# Patient Record
Sex: MALE | Race: WHITE
[De-identification: names, ages, dates, MRNs, and addresses within clinical notes are randomized per-mention and may not be internally consistent; named-entity substitution may affect disease eponyms.]

---

## 2017-10-02 ENCOUNTER — HOSPITAL ENCOUNTER (OUTPATIENT)
Dept: HOSPITAL 76 - ED | Age: 72
Setting detail: OBSERVATION
Discharge: HOME | End: 2017-10-02
Attending: SURGERY | Admitting: SURGERY
Payer: MEDICARE

## 2017-10-02 VITALS — DIASTOLIC BLOOD PRESSURE: 70 MMHG | SYSTOLIC BLOOD PRESSURE: 114 MMHG

## 2017-10-02 DIAGNOSIS — I10: ICD-10-CM

## 2017-10-02 DIAGNOSIS — Z79.899: ICD-10-CM

## 2017-10-02 DIAGNOSIS — Z86.79: ICD-10-CM

## 2017-10-02 DIAGNOSIS — M19.90: ICD-10-CM

## 2017-10-02 DIAGNOSIS — K35.89: Primary | ICD-10-CM

## 2017-10-02 DIAGNOSIS — Z79.82: ICD-10-CM

## 2017-10-02 DIAGNOSIS — F41.9: ICD-10-CM

## 2017-10-02 DIAGNOSIS — E78.00: ICD-10-CM

## 2017-10-02 DIAGNOSIS — F32.9: ICD-10-CM

## 2017-10-02 LAB
ALBUMIN/GLOB SERPL: 1.4 {RATIO} (ref 1–2.2)
ANION GAP SERPL CALCULATED.4IONS-SCNC: 8 MMOL/L (ref 6–13)
BASOPHILS NFR BLD AUTO: 0.1 10^3/UL (ref 0–0.1)
BASOPHILS NFR BLD AUTO: 0.6 %
BILIRUB BLD-MCNC: 0.9 MG/DL (ref 0.2–1)
BUN SERPL-MCNC: 20 MG/DL (ref 6–20)
CALCIUM UR-MCNC: 8.9 MG/DL (ref 8.5–10.3)
CHLORIDE SERPL-SCNC: 98 MMOL/L (ref 101–111)
CO2 SERPL-SCNC: 26 MMOL/L (ref 21–32)
CREAT SERPLBLD-SCNC: 0.8 MG/DL (ref 0.6–1.2)
CUL URINE ADD CHARGE: (no result)
EOSINOPHIL # BLD AUTO: 0 10^3/UL (ref 0–0.7)
EOSINOPHIL NFR BLD AUTO: 0.3 %
ERYTHROCYTE [DISTWIDTH] IN BLOOD BY AUTOMATED COUNT: 13.5 % (ref 12–15)
GFRSERPLBLD MDRD-ARVRAT: 96 ML/MIN/{1.73_M2} (ref 89–?)
GLOBULIN SER-MCNC: 3 G/DL (ref 2.1–4.2)
GLUCOSE SERPL-MCNC: 128 MG/DL (ref 70–100)
HCT VFR BLD AUTO: 39.2 % (ref 42–52)
HGB UR QL STRIP: 13.4 G/DL (ref 14–18)
LIPASE SERPL-CCNC: 19 U/L (ref 22–51)
LYMPHOCYTES # SPEC AUTO: 1.5 10^3/UL (ref 1.5–3.5)
LYMPHOCYTES NFR BLD AUTO: 11.4 %
MCH RBC QN AUTO: 30.2 PG (ref 27–31)
MCHC RBC AUTO-ENTMCNC: 34.1 G/DL (ref 32–36)
MCV RBC AUTO: 88.7 FL (ref 80–94)
MONOCYTES # BLD AUTO: 1.1 10^3/UL (ref 0–1)
MONOCYTES NFR BLD AUTO: 8.1 %
NEUTROPHILS # BLD AUTO: 10.5 10^3/UL (ref 1.5–6.6)
NEUTROPHILS # SNV AUTO: 13.3 X10^3/UL (ref 4.8–10.8)
NEUTROPHILS NFR BLD AUTO: 79.6 %
NRBC # BLD AUTO: 0 /100WBC
PDW BLD AUTO: 7.7 FL (ref 7.4–11.4)
PH UR STRIP.AUTO: 8.5 PH (ref 5–7.5)
POTASSIUM SERPL-SCNC: 4.3 MMOL/L (ref 3.5–5)
PROT SPEC-MCNC: 7.1 G/DL (ref 6.7–8.2)
RBC MAR: 4.43 10^6/UL (ref 4.7–6.1)
SODIUM SERPLBLD-SCNC: 132 MMOL/L (ref 135–145)
SP GR UR STRIP.AUTO: 1.01 (ref 1–1.03)
UA CHARGE (STRIP ONLY): YES
UA W/ MICROSCOPIC CHARGE: (no result)
UR CULTURE IF IND: (no result)
UROBILINOGEN UR STRIP.AUTO-MCNC: NEGATIVE MG/DL
WBC # BLD: 13.3 X10^3/UL

## 2017-10-02 PROCEDURE — 96365 THER/PROPH/DIAG IV INF INIT: CPT

## 2017-10-02 PROCEDURE — 36415 COLL VENOUS BLD VENIPUNCTURE: CPT

## 2017-10-02 PROCEDURE — 81001 URINALYSIS AUTO W/SCOPE: CPT

## 2017-10-02 PROCEDURE — 96375 TX/PRO/DX INJ NEW DRUG ADDON: CPT

## 2017-10-02 PROCEDURE — 99284 EMERGENCY DEPT VISIT MOD MDM: CPT

## 2017-10-02 PROCEDURE — 85025 COMPLETE CBC W/AUTO DIFF WBC: CPT

## 2017-10-02 PROCEDURE — 83690 ASSAY OF LIPASE: CPT

## 2017-10-02 PROCEDURE — 99283 EMERGENCY DEPT VISIT LOW MDM: CPT

## 2017-10-02 PROCEDURE — 44970 LAPAROSCOPY APPENDECTOMY: CPT

## 2017-10-02 PROCEDURE — 74177 CT ABD & PELVIS W/CONTRAST: CPT

## 2017-10-02 PROCEDURE — 81003 URINALYSIS AUTO W/O SCOPE: CPT

## 2017-10-02 PROCEDURE — 80053 COMPREHEN METABOLIC PANEL: CPT

## 2017-10-02 PROCEDURE — 87086 URINE CULTURE/COLONY COUNT: CPT

## 2017-10-02 PROCEDURE — 88304 TISSUE EXAM BY PATHOLOGIST: CPT

## 2017-10-02 PROCEDURE — 0DTJ4ZZ RESECTION OF APPENDIX, PERCUTANEOUS ENDOSCOPIC APPROACH: ICD-10-PCS | Performed by: SURGERY

## 2017-10-02 NOTE — HISTORY & PHYSICAL EXAMINATION
DATE OF ADMISSION: 10/02/2017

 

ADMITTING PHYSICIAN: Armin Hines MD.

 

REASON FOR ADMISSION: Abdominal pain.

 

HISTORY OF PRESENT ILLNESS: This is a 71-year-old gentleman who presented to 
the emergency department yesterday late in the evening after developing mid 
abdominal pain located at his umbilicus, which radiated to the right lower 
quadrant. Upon evaluation in the emergency department, labs and a CT scan were 
performed. He was noted to have a slight leukocytosis and CT scan findings 
consistent with unruptured acute appendicitis. On my evaluation of the patient, 
he states his pain is about a 2/10. His last meal was 6 p.m. last night. He 
also had chills at home, but has been afebrile in the hospital. Denies any 
history of nausea or vomiting. He was feeling fine up until 9 p.m. yesterday.

 

PAST MEDICAL HISTORY: Significant for history of pericarditis 4 years ago of 
unknown etiology, arthritis, hypertension, and hypercholesterolemia.

 

PAST SURGICAL HISTORY: Multiple knee arthroscopies bilaterally.

 

SOCIAL HISTORY: The patient denies tobacco use, alcohol use, or illicit drug 
use. The patient lives independently.

 

HOME MEDICATIONS

1. Lisinopril 10 mg p.o. daily.

2. Hydrochlorothiazide 12.5 mg p.o. daily.

3. Aspirin 81 mg daily.

 

ALLERGIES TO MEDICATIONS: PENICILLIN.

 

PHYSICAL EXAMINATION

VITAL SIGNS: Temperature is 36.9, blood pressure 126/70, respiratory rate 17, 
O2 saturation is 97% on room air, heart rate is 89.

GENERAL: The patient is awake, alert, oriented x3, in no acute distress. He is 
of average build.

CARDIOVASCULAR: Regular rate and rhythm.

CHEST: Clear to auscultation bilaterally. No rhonchi or wheezing.

ABDOMEN: Soft and nondistended. He is slightly tender to palpation in the right 
lower quadrant with no guarding and no rigidity.

EXTREMITIES: Nonedematous.

 

LABORATORY VALUES: White count 13.3, hemoglobin 13.4, hematocrit 39.2, 
platelets 202. Sodium 132, potassium 4.3, chloride 98, bicarbonate 26, BUN 20, 
creatinine 0.8.

 

ASSESSMENT: This is a 71-year-old gentleman with acute appendicitis.

 

PLAN: The patient will be taken to the operating room for laparoscopic, 
possible open appendectomy. The procedure was explained to the patient in 
detail including the potential risks involved including, but not limited to 
bleeding, infection and damage to intra-abdominal structures. He understands 
all of the above and agrees to proceed.

 

 

 

DD:10/02/2017 9:52:00  DT: 10/02/2017 10:10  JOB #: [id number]  EXT JOB #:[id number]

SATISH

## 2017-10-02 NOTE — DISCHARGE PLAN
Discharge Plan


Disposition: 01 Home, Self Care


Condition: Stable


Prescriptions: 


oxyCODONE/ACET 5/325 [Percocet 5 mg/325 mg] 1 tab PO Q4HR PRN #20 tablet


 PRN Reason: Pain


Diet: Regular


Activity Restrictions: see handout


No Smoking: If you smoke, Please STOP!  Call [phone] for help.


Follow-up with: 


SHIVA TRONCOSO MD [Provider Admit Priv/Credential] - 4 Weeks

## 2017-10-02 NOTE — CT PRELIMINARY REPORT
Accession: [id number]

Exam: CT Abdomen/Pelvis W/

 

IMPRESSION: 

1. Appendicitis measuring up to 13 mm in diameter. No abscess seen. 

2. Moderate to large amount of stool in the colon.

 

[facility] [place]

 

SITE ID: [id number]

## 2017-10-02 NOTE — CT REPORT
EXAM:

CT ABDOMEN AND PELVIS

 

EXAM DATE: 10/2/2017 04:12 AM.

 

CLINICAL HISTORY: Right lower quadrant tenderness.

 

COMPARISONS: None.

 

TECHNIQUE: Routine helical CT imaging was performed through the abdomen and pelvis. IV contrast: Davina
onic. Enteric contrast: No. Reconstructions: Coronal and sagittal.

 

In accordance with CT protocol optimization, one or more of the following dose reduction techniques w
ere utilized for this exam: automated exposure control, adjustment of mA and/or KV based on patient s
ize, or use of iterative reconstructive technique.

 

FINDINGS: 

Lung Bases: Bibasilar atelectasis.

 

Liver: Subcentimeter right lobe cyst.

 

Gallbladder/Bile Ducts: Unremarkable.

 

Spleen: Normal.

 

Pancreas: Normal.

 

Adrenal Glands: Normal.

 

Kidneys: Small left renal cyst. No masses or hydronephrosis.

 

Peritoneal Cavity/Bowel: Moderate to large amount of stool in the colon. No diverticulitis. No small 
bowel obstruction. No lymphadenopathy. No free air or free fluid. Dilated inflamed appendix measuring
 13 mm. Appendicoliths are seen. No periappendiceal abscess.

 

Pelvic Organs: Normal. The bladder and visualized pelvic organs are within normal limits.

 

Vasculature: Mild to moderate atherosclerosis. No aortic aneurysm.

 

Bones: Degenerative changes in the spine.

 

Other: None.

 

IMPRESSION: 

1. Appendicitis measuring up to 13 mm in diameter. No abscess seen. 

2. Moderate to large amount of stool in the colon.

 

[facility]

Referring Provider Line: [phone]

 

SITE ID: [id number]

## 2017-10-02 NOTE — ED PHYSICIAN DOCUMENTATION
PD HPI ABD PAIN





- Stated complaint


Stated Complaint: ABDOMINAL PAIN





- Chief complaint


Chief Complaint: Abd Pain





- History obtained from


History obtained from: Patient





- History of Present Illness


Timing - onset: Today


Timing - details: Gradual onset, Still present


Quality: Sharp


Location: RLQ


Associated symptoms: No: Fever, Nausea, Vomiting, Hematemesis, Diarrhea, 

Constipation, Dysuria, Hematuria


Similar symptoms before: Has not had sx before


Recently seen: Not recently seen





- Additional information


Additional information: 





Patient is a 71 year old male wiht a history of htn who is presenting to the 

emergency department for right lower quadrant pain.  Patient states that it 

started about 6 hours ago.  Patient stated that he was planking earlier but the 

only spot that hurts is his right lower quadrant.  patient denies nausea, 

vomiting fever or chills.  





Review of Systems


Constitutional: denies: Fever, Chills


Eyes: denies: Decreased vision, Photophobia


Ears: denies: Ear pain, Drainage/discharge


Nose: denies: Congestion


Throat: denies: Sore throat


Cardiac: denies: Chest pain / pressure, Palpitations


Respiratory: denies: Dyspnea, Cough, Wheezing


GI: reports: Abdominal Pain.  denies: Nausea, Vomiting, Constipation, Diarrhea


: denies: Dysuria, Frequency, Hesitancy, Hematuria


Skin: denies: Rash, Lesions


Musculoskeletal: denies: Neck pain, Back pain


Neurologic: denies: Generalized weakness, Focal weakness, Numbness


Immunocompromised: denies: Immunocompromised





PD PAST MEDICAL HISTORY





- Past Medical History


Cardiovascular: Hypertension, Angina


Respiratory: None


Endocrine/Autoimmune: None


GI: None, Other


: None


HEENT: None


Psych: Depression, Anxiety


Musculoskeletal: None


Derm: None





- Past Surgical History


General: Other


Ortho: Arthroscopic surgery


HEENT: Tonsil/Adenoidectomy





- Present Medications


Home Medications: 


 Ambulatory Orders











 Medication  Instructions  Recorded  Confirmed


 


Aspirin 81 mg PO DAILY 10/24/13 10/02/17


 


Lisinopril/Hydrochlorothiazide 1 each PO DAILY 10/24/13 10/02/17





[Lisinopril-Hctz 10-12.5 mg Tab]   


 


Atorvastatin [Lipitor] 20 mg PO 10/02/17 














- Allergies


Allergies/Adverse Reactions: 


 Allergies











Allergy/AdvReac Type Severity Reaction Status Date / Time


 


Penicillins Allergy Unknown UNK Verified 10/02/17 02:58














PD ED PE NORMAL





- Vitals


Vital signs reviewed: Yes





- General


General: Alert and oriented X 3





- HEENT


HEENT: Atraumatic, PERRL





- Neck


Neck: Supple, no meningeal sign





- Cardiac


Cardiac: RRR, No murmur





- Respiratory


Respiratory: No respiratory distress, Clear bilaterally





- Male 


Male : Other (normal)





- Derm


Derm: Normal color, Warm and dry, No rash





- Extremities


Extremities: No deformity, No tenderness to palpate, Normal ROM s pain





- Neuro


Neuro: Alert and oriented X 3, CNs 2-12 intact, No motor deficit, No sensory 

deficit, Normal speech





- Psych


Psych: Normal mood, Normal affect





PD ED PE EXPANDED





- Abdomen


Abdomen: Tender to palpation, Guarding, RLQ.  No: Rebound





Results





- Vitals


Vitals: 


 Vital Signs - 24 hr











  10/02/17





  02:55


 


Heart Rate 113 H


 


Respiratory 18





Rate 


 


Blood Pressure 134/81 H


 


O2 Saturation 100








 Oxygen











O2 Source                      Room air

















- Labs


Labs: 


 Laboratory Tests











  10/02/17 10/02/17





  03:20 03:20


 


WBC  13.3 H 


 


RBC  4.43 L 


 


Hgb  13.4 L 


 


Hct  39.2 L 


 


MCV  88.7 


 


MCH  30.2 


 


MCHC  34.1 


 


RDW  13.5 


 


Plt Count  202 


 


MPV  7.7 


 


Neut #  10.5 H 


 


Lymph #  1.5 


 


Mono #  1.1 H 


 


Eos #  0.0 


 


Baso #  0.1 


 


Absolute Nucleated RBC  0.00 


 


Nucleated RBC %  0.0 


 


Sodium   132 L


 


Potassium   4.3


 


Chloride   98 L


 


Carbon Dioxide   26


 


Anion Gap   8.0


 


BUN   20


 


Creatinine   0.8


 


Estimated GFR (MDRD)   96


 


Glucose   128 H


 


Calcium   8.9


 


Total Bilirubin   0.9


 


AST   28


 


ALT   29


 


Alkaline Phosphatase   51


 


Total Protein   7.1


 


Albumin   4.1


 


Globulin   3.0


 


Albumin/Globulin Ratio   1.4


 


Lipase   19 L














PD MEDICAL DECISION MAKING





- ED course


Complexity details: reviewed old records, reviewed results, re-evaluated patient

, considered differential, d/w patient, d/w consultant


ED course: 





Patient was seen and examined at bedside.  IV access was gained and labs were 

drawn.  patient was sent for imaging.  when patient returned the results were 

reviewed.  there was an acute appendicitis found with mild leukocytosis.  Case 

was discussed with oncall surgeon, Dr. Hines who admitted the patient for 

surgery later today.  





Departure





- Departure


Disposition: [facility] DC/Xfer


Clinical Impression: 


 Appendicitis





Condition: Stable

## 2017-10-03 NOTE — OPERATIVE REPORT
DATE OF SURGERY:  10/02/2017 00:00:00

 

PREOPERATIVE DIAGNOSIS: Acute appendicitis.

 

POSTOPERATIVE DIAGNOSIS: Acute appendicitis.

 

PROCEDURE PERFORMED: Laparoscopic appendectomy. 

 

SURGEON: Rashida Pace MD

 

ANESTHESIA PROVIDER: Sandeep Plunkett.

 

INDICATION FOR PROCEDURE: This is a 71-year-old male who presented to the 
emergency department yesterday with a 1-day history of abdominal pain and CT 
scan findings consistent with acute appendicitis.

 

FINDINGS: After obtaining informed consent from the patient, he was brought 
into the operating room and positioned on the operating table in the supine 
position, taking note of pressure points. He was intubated by Anesthesia. He 
was then prepped and draped in the usual sterile fashion, and a timeout was 
taken according to protocol. 

 

An infraumbilical 1 cm incision was created and deepened down to the umbilical 
stalk. This was grasped and elevated and the Veress needle inserted. The 
abdominal cavity was insufflated. A 5 mm incision was created in the left lower 
quadrant and the Optiview trocar utilized to enter the abdominal cavity. The 
Veress needle was removed and exchanged for a 12 mm port. The underlying 
peritoneum was visualized, and there was no injury upon entrance into the 
abdominal cavity. A 3rd 5 mm port was placed in the suprapubic region. 

 

The patient was positioned in Trendelenburg with the right side elevated and 
the appendix identified. It was noted to be very inflamed with surrounding 
phlegmon. The appendix was difficult to grasp. I rotated it medially and 
 the appendiceal attachments from the lateral sidewall with the 
LigaSure. The appendix was noted to be inflamed all the way down to its base. 
There was some purulent fluid exposed while mobilizing the appendix. The 
mesoappendix was then divided using the LigaSure and the base of the appendix 
completely exposed. The 45 mm [address] stapling device was utilized to amputate 
the appendix. It was placed in a specimen bag and removed through the umbilical 
port. The mesoappendix and staple line were inspected, and staple line appeared 
to be intact without any signs of bleeding. All purulent fluid was completely 
evacuated. The Enoc-Lisa device was then used to close the umbilical 
fascial defect using a 0 figure-of-8 Vicryl suture. The abdominal cavity was 
allowed to desufflate, and the skin incisions were closed with 4-0 Monocryl; 20 
mL of local anesthetic was utilized.

 

ESTIMATED BLOOD LOSS: 5 mL.

 

COMPLICATIONS: None.

 

SPECIMEN: Appendix.

 

 

 

DD:10/02/2017 11:45:00  DT: 10/02/2017 12:07  JOB #: [id number]  EXT JOB #:[id number]

MTDHOMA

## 2017-10-10 NOTE — PROVIDER PROGRESS NOTE
Subjective





- Prog Note Date


Prog Note Date: 10/02/17





- Subjective


Pt reports feeling: Improved





Objective





- Vital Signs/Intake & Output


Reviewed Vital Signs: Yes





- Objective


General Appearance: positive: No acute distress


Respiratory: positive: No respiratory distress


Cardiovascular: positive: Regular rate & rhythm


Abdomen: positive: Other (appropriate post surgical abdomen)


Extremities: positive: No pedal edema


Neurologic/Psychiatric: positive: Oriented x3





- Lab Results


Fish Bones: 


 10/02/17 03:20





 10/02/17 03:20





Assessment/Plan





- Problem List


(1) Appendicitis


Impression: 


Discharge home once recovered

## 2017-12-11 ENCOUNTER — HOSPITAL ENCOUNTER (OUTPATIENT)
Dept: HOSPITAL 76 - DI | Age: 72
Discharge: HOME | End: 2017-12-11
Attending: FAMILY MEDICINE
Payer: MEDICARE

## 2017-12-11 DIAGNOSIS — M47.897: Primary | ICD-10-CM

## 2017-12-11 PROCEDURE — 72100 X-RAY EXAM L-S SPINE 2/3 VWS: CPT

## 2017-12-12 NOTE — XRAY REPORT
EXAM:

LUMBOSACRAL SPINE RADIOGRAPHY

 

EXAM DATE: 12/11/2017 12:40 PM.

 

CLINICAL HISTORY: NUMBNESS L PLANTAR FOOT WITH PROLONGED STANDING.

 

COMPARISONS: Abdominal pelvic CT 10/02/2017.

 

TECHNIQUE: 3 views.

 

FINDINGS:

Alignment: Normal. No spondylolisthesis or scoliosis.

 

Bones: 6 non-rib-bearing lumbar vertebral bodies are present, the lowest lumbar shaped vertebral body
 will be labeled L5. 

No fractures or bone lesions.

 

Disks: Moderate to severe loss of L5-S1 disk space. 

Mild loss of disk space height at L3-L4 and L4-L5 levels. 

Small anterior osteophytes throughout the lumbar spine, sparing L4-L5 level.

 

Facets: Normally aligned. Mild L5-S1 facet hypertrophy.

 

Sacroiliac Joints: Unremarkable.

 

Soft Tissues: Prominent stool within right hemicolon. No dilated bowel evident. Atherosclerotic abdom
inal aortic calcifications.

 

IMPRESSION: 

1. Prominent L5-S1 degenerative changes.

2. No fracture evident.

 

[facility]

Referring Provider Line: [phone]

 

SITE ID: [id number]

## 2018-01-09 ENCOUNTER — HOSPITAL ENCOUNTER (OUTPATIENT)
Dept: HOSPITAL 76 - DI | Age: 73
Discharge: HOME | End: 2018-01-09
Attending: INTERNAL MEDICINE
Payer: MEDICARE

## 2018-01-09 DIAGNOSIS — M43.17: ICD-10-CM

## 2018-01-09 DIAGNOSIS — M51.25: ICD-10-CM

## 2018-01-09 DIAGNOSIS — M51.36: ICD-10-CM

## 2018-01-09 DIAGNOSIS — M51.26: Primary | ICD-10-CM

## 2018-01-09 PROCEDURE — 72148 MRI LUMBAR SPINE W/O DYE: CPT

## 2020-12-24 NOTE — MRI REPORT
EXAM:

MRI LUMBAR SPINE WITHOUT CONTRAST

 

EXAM DATE: 1/9/2018 09:31 AM.

 

CLINICAL HISTORY: Neural claudication.

 

COMPARISON: Lumbar spine radiographs 12/11/2017.

 

TECHNIQUE: Multiplanar, multisequence T1-weighted and fluid-sensitive sequences of the lumbar spine w
ithout contrast. Other: None.

 

FINDINGS: 

A tiny 3 mm T2 hyperintensity is seen in the lower pole of the left kidney. Statistically, this likel
y reflects a tiny cortical cyst. The visualized abdominal aorta is not aneurysmal.

 

On the comparison radiographs there are 6 non-rib-bearing lumbar-type vertebral bodies. For numbering
 purposes, the first non-rib-bearing lumbar-type vertebral body will be labeled as S1. This numbering
 scheme makes the transitional lumbosacral type vertebral body.

 

No suspicious marrow replacement is identified in the lumbar vertebral bodies.

 

Diskogenic marrow edema is seen at L6-S1. Grade 1 retrolisthesis of L6 relative to S1 and L4 relative
 to L5 is noted.

 

The distal tip of the conus medullaris is seen near the L2-L3 disk space level.

 

T10-T11 and T11-T12: No posterior disk protrusion.

 

T12-L1: A mild right paracentral disk protrusion is seen. There is no central canal or foraminal sten
osis on the sagittal views.

 

L1-L2: No posterior disk protrusion.

 

L2-L3: A mild posterior disk protrusion is seen. Superior lateral recess narrowing is present bilater
ally.

 

L3-L4: A minimal posterior disk protrusion is seen. There is a shallow foraminal and far lateral comp
onent bilaterally, greater on the left relative to the right. Disk material attenuates the fat surrou
nding the left L3 nerve root after it exits its foramen. No overt foraminal stenosis is present. Supe
rior lateral recess narrowing is present bilaterally. No central canal stenosis.

 

L4-L5: A mild posterior disk protrusion is seen. There is a shallow posterolateral and foraminal comp
onent bilaterally. There is proximal foraminal narrowing bilaterally without overt foraminal stenosis
. There is a far lateral component of the disk protrusion bilaterally. Superior lateral recess narrow
ing is present bilaterally. No central canal stenosis.

 

L5-L6: A minimal posterior disk protrusion is seen with a shallow foraminal component bilaterally. Pr
oximal foraminal narrowing is seen bilaterally. No central canal or foraminal stenosis is present. Soliz
perior lateral recess narrowing is seen bilaterally.

 

L6-S1: Moderate loss of disk space height is present. A mild posterior disk protrusion is seen. There
 is a shallow foraminal protrusion bilaterally. No central canal or lateral recess stenosis is presen
t. There is moderate to severe left and moderate right foraminal stenosis.

 

Facet/ligamentum flavum hypertrophy is seen throughout the lumbar spine, greatest in the mid and lowe
r lumbar spine.

 

IMPRESSION: 

1. Degenerative disk disease is present at multiple levels discussed in detail. 

2. No central canal stenosis. 

3. Bilateral foraminal stenosis is present at L6-S1. 

4. There is a far lateral component to the protrusion on the left at L3-L4 with disk material attenua
ting the fat surrounding the left L3 nerve root after it exits its foramen. 

5. Diskogenic marrow edema is seen at L6-S1 and there is spondylolisthesis at this level. 

6. Ambiguous segmentation of the lumbar spine with the numbering scheme discussed above.

 

Comment: The following findings are so common in adults without low back pain that while we report th
eir presence, they must be interpreted with caution and in the context of the clinical situation. (Re
nicloe Pang et al, Spine 2001)

 

Prevalence of findings in patients without low back pain:

Disk degeneration (any evidence): 92%

Disk desiccation/T2 signal loss: 83%

Disk height loss: 56%

Disk bulge: 64%

Disk protrusion: 32%

Annular tear/high intensity zone: 38%

 

[facility]

Referring Provider Line: [phone]

 

SITE ID: [id number]
none

## 2021-11-02 ENCOUNTER — HOSPITAL ENCOUNTER (OUTPATIENT)
Dept: HOSPITAL 76 - DI | Age: 76
Discharge: HOME | End: 2021-11-02
Attending: PHYSICIAN ASSISTANT
Payer: MEDICARE

## 2021-11-02 DIAGNOSIS — R07.89: Primary | ICD-10-CM

## 2021-11-02 DIAGNOSIS — Z82.49: ICD-10-CM

## 2021-11-02 DIAGNOSIS — R53.83: ICD-10-CM

## 2021-11-02 DIAGNOSIS — I10: ICD-10-CM

## 2021-11-02 PROCEDURE — 93017 CV STRESS TEST TRACING ONLY: CPT

## 2021-11-02 NOTE — CARDIAC PROCEDURE NOTE
Stress Test Report


Service Date: 11/02/21


Service Time: 09:00


Ordering Provider: Reina Samuels PA-C


Indication for Test: 


Assess episodic non-exertional left upper chest discomfort.





Significant Medical History: 


-Juan reports a history of hypertension treated for the past 20-25 years and has 

been on statin therapy chronically, to lower cholesterol.  He has been very 

active, walking around [facility] (with hills), a total of 4-7 miles almost every

day.  About 3-4 weeks ago he began to experience an episodic, mild, non-

exertional left upper chest discomfort, that has had no identifiable pattern of 

occurrence, no triggers and no alleviating factors. There have been no 

associated concomitants with the discomfort, such as dyspnea, diaphoresis, 

nausea or lightheadedness. He was seen in clinic and a treadmill stress test was

ordered for today, but he reports that the discomfort has been less evident for 

the past week or 2, almost nonexistent.  However on further questioning prior to

starting the test he admits to feeling it at a level of "one half out of 10".


-He has a cardiac history that includes an episode of pericarditis about 8 years

ago, for which he underwent a coronary angiogram at [facility] in [place], 

that revealed a single vessel with 20% narrowing.  He is concerned about the 

fact that his father experienced complex CAD at about the same age he is now, 

requiring bypass surgery and valve replacement.  He feels that his lifestyle has

been healthy, given his high exercise level and a diet that is low in red meat, 

while continuing on fish oil and simvastatin chronically. He does not monitor 

his blood pressure at home, though he believes it has been controlled at clinic 

visits.





Cardiac Risk Factors: 


Positive for age, male gender, hypertension, hyperlipidemia and CAD in his 

father; negative for diabetes or tobacco use, ever.





Type of Stress Test: Exercise Treadmill Test (ETT)


Procedure: 


-Exercise Treadmill Test-





After signing informed consent, the patient performed treadmill exercise using a

Ced protocol. The patient exercised for 9 minutes 19 seconds and achieved a 

peak heart rate of 152 (104 percent predicted maximum heart rate for age), and 

an estimated workload of 10.7 METS.





The test was terminated due to fatigue/shortness of breath, occurring after he 

achieved target HR.





Resting heart rate: 76   Peak heart rate: 152   Normal response to exercise.


Resting BP: 148/77   Peak BP: 199/91   Mildly hypertensive at rest with 

physiologic BP response to exercise. Note that he last took his HCTZ and 

lisinopril last evening.


Rhythm during exercise: Sinus rhythm with rare isolated PVCs. 





Symptoms: Early in exercise he reported that the mild left upper chest 

discomfort presesnt at test onset had fully resolved; in stage 4 he described a 

different, mild discomfort lower down in his left chest, around the level of his

left nipple.  This discomfort resolved almost immediately following conclusion 

of exercise.





EKG at rest showed normal sinus rhythm, with left atrial abnormality and low 

precordial QRS voltages; QRS/ST/T morphologies were normal throughout the 

tracing.


EKG at peak stress showed J-point depression with mostly upsloping ST depression

in inferior and anterolateral leads, probably maximal at about 0.7 mm; in the 

setting of the different, more typical lower left chest discomfort these should 

be considered borderline positive for ischemia.





In Recovery heart rate and blood pressure returned normally to baseline levels.





No imaging was ordered with this stress test.





I, Ha Wooten MD, was present throughout this treadmill exercise test and 

supervised it in its entirety.





Summary: 


1) Exercise tolerance well above average for age, as evidenced by ANDRZEJ of -48%.


2) Normal resting EKG.


3) Adequate level of exercise was achieved on this treadmill stress test. 


4) Mildly hypertensive at rest with physiologic BP response to exercise.


5) Borderline ischemic changes by EKG criteria were seen at peak stress.


6) No imaging was ordered with this test.





CONCLUSIONS:


1) Phillip did not experience the atypical upper left chest discomfort that 

prompted this ETT; however, he did experience very mild discomfort of more 

typical character during stage 4, that resolved nearly immediately with exercise

discontinuation. He stated that the intensity of exercise during stage 4 was 

above what he would likely ever achieve during his extensive walking.


2) I recommended that he: continue ASA 81 mg at least every other day; obtain an

arm BP cuff with which to measure and record blood pressures at several 

assessments in the mornings prior to taking BP medications, and again 10-12 

hours later; and pay careful attention to whether there is recurrence of the 

more typical left lower chest discomfort and report any occurrence of this 

symptom if it recurs. He should make an appointment with Dr. Guerin in 3-4 

weeks, at which time they can review interim symptoms, lab results and adequacy 

of blood pressure control. I would recommend that his LDL cholesterol be 

targeted to the range of 5060 mg/dL, which may require transition to a high 

potency statin.


3) Should there be concern for recurrence of the more typical exertional chest 

discomfort, then a stress test with imaging (with echo or myocardial perfusion 

study) would be advisable.

## 2023-08-24 ENCOUNTER — HOSPITAL ENCOUNTER (EMERGENCY)
Age: 78
Discharge: HOME | End: 2023-08-24
Payer: MEDICARE

## 2023-08-24 VITALS
OXYGEN SATURATION: 97 % | DIASTOLIC BLOOD PRESSURE: 77 MMHG | HEART RATE: 78 BPM | RESPIRATION RATE: 18 BRPM | SYSTOLIC BLOOD PRESSURE: 140 MMHG

## 2023-08-24 VITALS
SYSTOLIC BLOOD PRESSURE: 144 MMHG | RESPIRATION RATE: 22 BRPM | OXYGEN SATURATION: 97 % | DIASTOLIC BLOOD PRESSURE: 81 MMHG | HEART RATE: 74 BPM

## 2023-08-24 VITALS
SYSTOLIC BLOOD PRESSURE: 163 MMHG | HEART RATE: 87 BPM | DIASTOLIC BLOOD PRESSURE: 95 MMHG | TEMPERATURE: 98 F | RESPIRATION RATE: 14 BRPM | OXYGEN SATURATION: 99 %

## 2023-08-24 VITALS — BODY MASS INDEX: 25.8 KG/M2

## 2023-08-24 VITALS — OXYGEN SATURATION: 99 % | RESPIRATION RATE: 24 BRPM | HEART RATE: 87 BPM

## 2023-08-24 VITALS — OXYGEN SATURATION: 98 % | RESPIRATION RATE: 14 BRPM | HEART RATE: 73 BPM

## 2023-08-24 DIAGNOSIS — S09.90XA: ICD-10-CM

## 2023-08-24 DIAGNOSIS — Z23: ICD-10-CM

## 2023-08-24 DIAGNOSIS — R55: Primary | ICD-10-CM

## 2023-08-24 LAB
ADD MANUAL DIFF / SLIDE REVIEW: NO
ALBUMIN SERPL-MCNC: 4.4 G/DL (ref 3.5–5)
ALBUMIN/GLOB SERPL: 1.6 {RATIO} (ref 1–2.8)
ALP SERPL-CCNC: 52 U/L (ref 38–126)
ALT SERPL-CCNC: 31 IU/L (ref ?–50)
BUN SERPL-MCNC: 27 MG/DL (ref 9–20)
CALCIUM SERPL-MCNC: 9.2 MG/DL (ref 8.4–10.2)
CHLORIDE SERPL-SCNC: 101 MMOL/L (ref 98–107)
CO2 SERPL-SCNC: 26 MMOL/L (ref 22–32)
ESTIMATED GLOMERULAR FILT RATE: > 60 ML/MIN (ref 60–?)
GLOBULIN SER CALC-MCNC: 2.8 G/DL (ref 1.7–4.1)
GLUCOSE SERPL-MCNC: 108 MG/DL (ref 80–110)
HEMATOCRIT: 42.1 % (ref 41–53)
HEMOGLOBIN: 14.4 G/DL (ref 13.5–17.5)
HEMOLYSIS: < 15 (ref 0–50)
LYMPHOCYTES # SPEC AUTO: 4100 /UL (ref 1100–4500)
MCV RBC: 89.3 FL (ref 80–100)
MEAN CORPUSCULAR HEMOGLOBIN: 30.5 PG (ref 26–34)
MEAN CORPUSCULAR HGB CONC: 34.2 % (ref 30–36)
PLATELET COUNT: 241 X10^3/UL (ref 150–400)
POTASSIUM SERPL-SCNC: 4.6 MMOL/L (ref 3.4–5.1)
PROT SERPL-MCNC: 7.2 G/DL (ref 6.3–8.2)
SODIUM SERPL-SCNC: 135 MMOL/L (ref 137–145)
TROPONIN I SERPL-MCNC: < 0.012 NG/ML (ref 0.01–0.03)

## 2023-08-24 PROCEDURE — 96360 HYDRATION IV INFUSION INIT: CPT

## 2023-08-24 PROCEDURE — 99284 EMERGENCY DEPT VISIT MOD MDM: CPT

## 2023-08-24 PROCEDURE — 36415 COLL VENOUS BLD VENIPUNCTURE: CPT

## 2023-08-24 PROCEDURE — 85025 COMPLETE CBC W/AUTO DIFF WBC: CPT

## 2023-08-24 PROCEDURE — 93005 ELECTROCARDIOGRAM TRACING: CPT

## 2023-08-24 PROCEDURE — 80053 COMPREHEN METABOLIC PANEL: CPT

## 2023-08-24 PROCEDURE — 90471 IMMUNIZATION ADMIN: CPT

## 2023-08-24 PROCEDURE — 84484 ASSAY OF TROPONIN QUANT: CPT

## 2023-08-24 PROCEDURE — 70450 CT HEAD/BRAIN W/O DYE: CPT

## 2023-10-30 ENCOUNTER — HOSPITAL ENCOUNTER (OUTPATIENT)
Dept: HOSPITAL 76 - DI | Age: 78
Discharge: HOME | End: 2023-10-30
Attending: INTERNAL MEDICINE
Payer: MEDICARE

## 2023-10-30 DIAGNOSIS — M19.042: ICD-10-CM

## 2023-10-30 DIAGNOSIS — M65.332: Primary | ICD-10-CM

## 2023-10-30 DIAGNOSIS — M19.041: ICD-10-CM

## 2023-10-30 NOTE — XRAY REPORT
PROCEDURE:  Hand 3 View BILAT

 

INDICATIONS:  BILAT HAND PAIN

 

TECHNIQUE:  3 views of the hand(s) acquired.  

 

COMPARISON:  None.

 

FINDINGS:  

 

Bones:  No fractures or dislocations.  No suspicious bony lesions.   There is overall appearance of m
ild scattered IP degenerative narrowing. No erosions. No definitive osteophytes.

 

Soft tissues:  No suspicious soft tissue calcifications or masses.  

 

 

IMPRESSION:  

Scattered mild IP arthritic change.

 

 

 

Reviewed by: Ashley Reza MD on 10/30/2023 4:13 PM PDT

Approved by: Ashley Reza MD on 10/30/2023 4:13 PM PDT

 

 

Station ID:  [id number]

## 2024-02-21 ENCOUNTER — HOSPITAL ENCOUNTER (OUTPATIENT)
Age: 79
End: 2024-02-21
Payer: MEDICARE

## 2024-02-21 DIAGNOSIS — M79.645: Primary | ICD-10-CM

## 2024-02-21 PROCEDURE — 73140 X-RAY EXAM OF FINGER(S): CPT

## 2024-11-25 ENCOUNTER — HOSPITAL ENCOUNTER (OUTPATIENT)
Age: 79
End: 2024-11-25
Payer: MEDICARE

## 2024-11-25 DIAGNOSIS — R07.81: Primary | ICD-10-CM

## 2024-11-25 PROCEDURE — 71101 X-RAY EXAM UNILAT RIBS/CHEST: CPT
